# Patient Record
Sex: MALE | Race: WHITE | NOT HISPANIC OR LATINO | Employment: OTHER | URBAN - METROPOLITAN AREA
[De-identification: names, ages, dates, MRNs, and addresses within clinical notes are randomized per-mention and may not be internally consistent; named-entity substitution may affect disease eponyms.]

---

## 2020-07-16 ENCOUNTER — APPOINTMENT (OUTPATIENT)
Dept: RADIOLOGY | Facility: MEDICAL CENTER | Age: 65
End: 2020-07-16
Attending: HOSPITALIST

## 2020-07-16 ENCOUNTER — APPOINTMENT (OUTPATIENT)
Dept: RADIOLOGY | Facility: MEDICAL CENTER | Age: 65
End: 2020-07-16
Attending: EMERGENCY MEDICINE

## 2020-07-16 ENCOUNTER — OFFICE VISIT (OUTPATIENT)
Dept: URGENT CARE | Facility: PHYSICIAN GROUP | Age: 65
End: 2020-07-16

## 2020-07-16 ENCOUNTER — HOSPITAL ENCOUNTER (OUTPATIENT)
Facility: MEDICAL CENTER | Age: 65
End: 2020-07-17
Attending: EMERGENCY MEDICINE | Admitting: HOSPITALIST

## 2020-07-16 VITALS
RESPIRATION RATE: 20 BRPM | TEMPERATURE: 98.3 F | OXYGEN SATURATION: 97 % | WEIGHT: 192 LBS | SYSTOLIC BLOOD PRESSURE: 140 MMHG | DIASTOLIC BLOOD PRESSURE: 72 MMHG | BODY MASS INDEX: 27.49 KG/M2 | HEIGHT: 70 IN | HEART RATE: 72 BPM

## 2020-07-16 DIAGNOSIS — R07.89 OTHER CHEST PAIN: ICD-10-CM

## 2020-07-16 DIAGNOSIS — I10 ESSENTIAL HYPERTENSION: ICD-10-CM

## 2020-07-16 DIAGNOSIS — R94.31 ST ELEVATION: ICD-10-CM

## 2020-07-16 DIAGNOSIS — R07.1 CHEST PAIN ON BREATHING: ICD-10-CM

## 2020-07-16 DIAGNOSIS — Z98.890 STATUS POST LUMBAR SPINE SURGERY FOR DECOMPRESSION OF SPINAL CORD: ICD-10-CM

## 2020-07-16 DIAGNOSIS — Z87.891 SMOKING HISTORY: ICD-10-CM

## 2020-07-16 DIAGNOSIS — R06.02 SHORTNESS OF BREATH: ICD-10-CM

## 2020-07-16 PROBLEM — E87.6 HYPOKALEMIA: Status: ACTIVE | Noted: 2020-07-16

## 2020-07-16 PROBLEM — Z72.0 TOBACCO ABUSE: Status: ACTIVE | Noted: 2020-07-16

## 2020-07-16 LAB
ALBUMIN SERPL BCP-MCNC: 4.1 G/DL (ref 3.2–4.9)
ALBUMIN/GLOB SERPL: 1.5 G/DL
ALP SERPL-CCNC: 71 U/L (ref 30–99)
ALT SERPL-CCNC: 35 U/L (ref 2–50)
ANION GAP SERPL CALC-SCNC: 13 MMOL/L (ref 7–16)
APTT PPP: 32.3 SEC (ref 24.7–36)
AST SERPL-CCNC: 21 U/L (ref 12–45)
BASOPHILS # BLD AUTO: 0.4 % (ref 0–1.8)
BASOPHILS # BLD: 0.03 K/UL (ref 0–0.12)
BILIRUB SERPL-MCNC: 0.9 MG/DL (ref 0.1–1.5)
BUN SERPL-MCNC: 16 MG/DL (ref 8–22)
CALCIUM SERPL-MCNC: 9.2 MG/DL (ref 8.5–10.5)
CHLORIDE SERPL-SCNC: 102 MMOL/L (ref 96–112)
CO2 SERPL-SCNC: 25 MMOL/L (ref 20–33)
COVID ORDER STATUS COVID19: NORMAL
CREAT SERPL-MCNC: 0.69 MG/DL (ref 0.5–1.4)
D DIMER PPP IA.FEU-MCNC: 1.58 UG/ML (FEU) (ref 0–0.5)
EKG IMPRESSION: NORMAL
EKG IMPRESSION: NORMAL
EOSINOPHIL # BLD AUTO: 0.26 K/UL (ref 0–0.51)
EOSINOPHIL NFR BLD: 3.7 % (ref 0–6.9)
ERYTHROCYTE [DISTWIDTH] IN BLOOD BY AUTOMATED COUNT: 39.9 FL (ref 35.9–50)
GLOBULIN SER CALC-MCNC: 2.8 G/DL (ref 1.9–3.5)
GLUCOSE SERPL-MCNC: 108 MG/DL (ref 65–99)
HCT VFR BLD AUTO: 38.9 % (ref 42–52)
HGB BLD-MCNC: 14.1 G/DL (ref 14–18)
IMM GRANULOCYTES # BLD AUTO: 0.02 K/UL (ref 0–0.11)
IMM GRANULOCYTES NFR BLD AUTO: 0.3 % (ref 0–0.9)
INR PPP: 1.03 (ref 0.87–1.13)
LYMPHOCYTES # BLD AUTO: 1.24 K/UL (ref 1–4.8)
LYMPHOCYTES NFR BLD: 17.4 % (ref 22–41)
MCH RBC QN AUTO: 31.3 PG (ref 27–33)
MCHC RBC AUTO-ENTMCNC: 36.2 G/DL (ref 33.7–35.3)
MCV RBC AUTO: 86.4 FL (ref 81.4–97.8)
MONOCYTES # BLD AUTO: 0.77 K/UL (ref 0–0.85)
MONOCYTES NFR BLD AUTO: 10.8 % (ref 0–13.4)
NEUTROPHILS # BLD AUTO: 4.8 K/UL (ref 1.82–7.42)
NEUTROPHILS NFR BLD: 67.4 % (ref 44–72)
NRBC # BLD AUTO: 0 K/UL
NRBC BLD-RTO: 0 /100 WBC
PLATELET # BLD AUTO: 131 K/UL (ref 164–446)
PMV BLD AUTO: 11.3 FL (ref 9–12.9)
POTASSIUM SERPL-SCNC: 3.3 MMOL/L (ref 3.6–5.5)
PROT SERPL-MCNC: 6.9 G/DL (ref 6–8.2)
PROTHROMBIN TIME: 13.8 SEC (ref 12–14.6)
RBC # BLD AUTO: 4.5 M/UL (ref 4.7–6.1)
SODIUM SERPL-SCNC: 140 MMOL/L (ref 135–145)
TROPONIN T SERPL-MCNC: 12 NG/L (ref 6–19)
TROPONIN T SERPL-MCNC: 13 NG/L (ref 6–19)
TROPONIN T SERPL-MCNC: 9 NG/L (ref 6–19)
WBC # BLD AUTO: 7.1 K/UL (ref 4.8–10.8)

## 2020-07-16 PROCEDURE — 80053 COMPREHEN METABOLIC PANEL: CPT

## 2020-07-16 PROCEDURE — 99205 OFFICE O/P NEW HI 60 MIN: CPT | Performed by: NURSE PRACTITIONER

## 2020-07-16 PROCEDURE — 700111 HCHG RX REV CODE 636 W/ 250 OVERRIDE (IP): Performed by: EMERGENCY MEDICINE

## 2020-07-16 PROCEDURE — 84484 ASSAY OF TROPONIN QUANT: CPT

## 2020-07-16 PROCEDURE — C9803 HOPD COVID-19 SPEC COLLECT: HCPCS | Performed by: HOSPITALIST

## 2020-07-16 PROCEDURE — 99285 EMERGENCY DEPT VISIT HI MDM: CPT

## 2020-07-16 PROCEDURE — 71045 X-RAY EXAM CHEST 1 VIEW: CPT

## 2020-07-16 PROCEDURE — 99220 PR INITIAL OBSERVATION CARE,LEVL III: CPT | Performed by: HOSPITALIST

## 2020-07-16 PROCEDURE — 96374 THER/PROPH/DIAG INJ IV PUSH: CPT

## 2020-07-16 PROCEDURE — 85610 PROTHROMBIN TIME: CPT

## 2020-07-16 PROCEDURE — 85025 COMPLETE CBC W/AUTO DIFF WBC: CPT

## 2020-07-16 PROCEDURE — 85379 FIBRIN DEGRADATION QUANT: CPT

## 2020-07-16 PROCEDURE — A9270 NON-COVERED ITEM OR SERVICE: HCPCS | Performed by: HOSPITALIST

## 2020-07-16 PROCEDURE — 93005 ELECTROCARDIOGRAM TRACING: CPT | Performed by: EMERGENCY MEDICINE

## 2020-07-16 PROCEDURE — 99244 OFF/OP CNSLTJ NEW/EST MOD 40: CPT | Performed by: INTERNAL MEDICINE

## 2020-07-16 PROCEDURE — 93005 ELECTROCARDIOGRAM TRACING: CPT

## 2020-07-16 PROCEDURE — G0378 HOSPITAL OBSERVATION PER HR: HCPCS

## 2020-07-16 PROCEDURE — 96375 TX/PRO/DX INJ NEW DRUG ADDON: CPT

## 2020-07-16 PROCEDURE — 93000 ELECTROCARDIOGRAM COMPLETE: CPT | Performed by: NURSE PRACTITIONER

## 2020-07-16 PROCEDURE — U0003 INFECTIOUS AGENT DETECTION BY NUCLEIC ACID (DNA OR RNA); SEVERE ACUTE RESPIRATORY SYNDROME CORONAVIRUS 2 (SARS-COV-2) (CORONAVIRUS DISEASE [COVID-19]), AMPLIFIED PROBE TECHNIQUE, MAKING USE OF HIGH THROUGHPUT TECHNOLOGIES AS DESCRIBED BY CMS-2020-01-R: HCPCS

## 2020-07-16 PROCEDURE — 85730 THROMBOPLASTIN TIME PARTIAL: CPT

## 2020-07-16 PROCEDURE — 700102 HCHG RX REV CODE 250 W/ 637 OVERRIDE(OP): Performed by: HOSPITALIST

## 2020-07-16 RX ORDER — HYDROCODONE BITARTRATE AND ACETAMINOPHEN 10; 325 MG/1; MG/1
1 TABLET ORAL EVERY 6 HOURS PRN
COMMUNITY

## 2020-07-16 RX ORDER — NICOTINE 21 MG/24HR
21 PATCH, TRANSDERMAL 24 HOURS TRANSDERMAL
Status: DISCONTINUED | OUTPATIENT
Start: 2020-07-16 | End: 2020-07-17 | Stop reason: HOSPADM

## 2020-07-16 RX ORDER — POLYETHYLENE GLYCOL 3350 17 G/17G
1 POWDER, FOR SOLUTION ORAL
Status: DISCONTINUED | OUTPATIENT
Start: 2020-07-16 | End: 2020-07-17 | Stop reason: HOSPADM

## 2020-07-16 RX ORDER — AMOXICILLIN 250 MG
2 CAPSULE ORAL 2 TIMES DAILY
Status: DISCONTINUED | OUTPATIENT
Start: 2020-07-16 | End: 2020-07-17 | Stop reason: HOSPADM

## 2020-07-16 RX ORDER — LISINOPRIL 20 MG/1
20 TABLET ORAL DAILY
COMMUNITY

## 2020-07-16 RX ORDER — ACETAMINOPHEN 325 MG/1
650 TABLET ORAL EVERY 6 HOURS PRN
Status: DISCONTINUED | OUTPATIENT
Start: 2020-07-16 | End: 2020-07-17 | Stop reason: HOSPADM

## 2020-07-16 RX ORDER — DIAZEPAM 5 MG/1
5 TABLET ORAL
COMMUNITY

## 2020-07-16 RX ORDER — NIFEDIPINE 90 MG/1
90 TABLET, EXTENDED RELEASE ORAL DAILY
COMMUNITY

## 2020-07-16 RX ORDER — NIFEDIPINE 90 MG/1
90 TABLET, EXTENDED RELEASE ORAL DAILY
Status: DISCONTINUED | OUTPATIENT
Start: 2020-07-17 | End: 2020-07-17 | Stop reason: HOSPADM

## 2020-07-16 RX ORDER — PROMETHAZINE HYDROCHLORIDE 25 MG/1
12.5-25 TABLET ORAL EVERY 4 HOURS PRN
Status: DISCONTINUED | OUTPATIENT
Start: 2020-07-16 | End: 2020-07-17 | Stop reason: HOSPADM

## 2020-07-16 RX ORDER — BISACODYL 10 MG
10 SUPPOSITORY, RECTAL RECTAL
Status: DISCONTINUED | OUTPATIENT
Start: 2020-07-16 | End: 2020-07-17 | Stop reason: HOSPADM

## 2020-07-16 RX ORDER — ONDANSETRON 2 MG/ML
4 INJECTION INTRAMUSCULAR; INTRAVENOUS ONCE
Status: COMPLETED | OUTPATIENT
Start: 2020-07-16 | End: 2020-07-16

## 2020-07-16 RX ORDER — MORPHINE SULFATE 4 MG/ML
4 INJECTION, SOLUTION INTRAMUSCULAR; INTRAVENOUS ONCE
Status: COMPLETED | OUTPATIENT
Start: 2020-07-16 | End: 2020-07-16

## 2020-07-16 RX ORDER — NIFEDIPINE 20 MG/1
90 CAPSULE ORAL DAILY
COMMUNITY
End: 2020-07-16

## 2020-07-16 RX ORDER — ONDANSETRON 2 MG/ML
4 INJECTION INTRAMUSCULAR; INTRAVENOUS EVERY 4 HOURS PRN
Status: DISCONTINUED | OUTPATIENT
Start: 2020-07-16 | End: 2020-07-17 | Stop reason: HOSPADM

## 2020-07-16 RX ORDER — DIAZEPAM 5 MG/1
5 TABLET ORAL
Status: DISCONTINUED | OUTPATIENT
Start: 2020-07-16 | End: 2020-07-17 | Stop reason: HOSPADM

## 2020-07-16 RX ORDER — ONDANSETRON 4 MG/1
4 TABLET, ORALLY DISINTEGRATING ORAL EVERY 4 HOURS PRN
Status: DISCONTINUED | OUTPATIENT
Start: 2020-07-16 | End: 2020-07-17 | Stop reason: HOSPADM

## 2020-07-16 RX ORDER — NAPROXEN SODIUM 220 MG
880 TABLET ORAL 3 TIMES DAILY
COMMUNITY

## 2020-07-16 RX ORDER — LISINOPRIL 20 MG/1
20 TABLET ORAL DAILY
Status: DISCONTINUED | OUTPATIENT
Start: 2020-07-17 | End: 2020-07-17 | Stop reason: HOSPADM

## 2020-07-16 RX ORDER — PROCHLORPERAZINE EDISYLATE 5 MG/ML
5-10 INJECTION INTRAMUSCULAR; INTRAVENOUS EVERY 4 HOURS PRN
Status: DISCONTINUED | OUTPATIENT
Start: 2020-07-16 | End: 2020-07-17 | Stop reason: HOSPADM

## 2020-07-16 RX ORDER — PROMETHAZINE HYDROCHLORIDE 12.5 MG/1
12.5-25 SUPPOSITORY RECTAL EVERY 4 HOURS PRN
Status: DISCONTINUED | OUTPATIENT
Start: 2020-07-16 | End: 2020-07-17 | Stop reason: HOSPADM

## 2020-07-16 RX ORDER — NICOTINE 21 MG/24HR
21 PATCH, TRANSDERMAL 24 HOURS TRANSDERMAL
Status: DISCONTINUED | OUTPATIENT
Start: 2020-07-17 | End: 2020-07-16

## 2020-07-16 RX ADMIN — MORPHINE SULFATE 4 MG: 4 INJECTION INTRAVENOUS at 15:10

## 2020-07-16 RX ADMIN — DIAZEPAM 5 MG: 5 TABLET ORAL at 20:02

## 2020-07-16 RX ADMIN — ONDANSETRON 4 MG: 2 INJECTION INTRAMUSCULAR; INTRAVENOUS at 15:10

## 2020-07-16 ASSESSMENT — LIFESTYLE VARIABLES
ON A TYPICAL DAY WHEN YOU DRINK ALCOHOL HOW MANY DRINKS DO YOU HAVE: 0
EVER FELT BAD OR GUILTY ABOUT YOUR DRINKING: NO
ALCOHOL_USE: NO
HAVE PEOPLE ANNOYED YOU BY CRITICIZING YOUR DRINKING: NO
AVERAGE NUMBER OF DAYS PER WEEK YOU HAVE A DRINK CONTAINING ALCOHOL: 0
CONSUMPTION TOTAL: NEGATIVE
TOTAL SCORE: 0
EVER HAD A DRINK FIRST THING IN THE MORNING TO STEADY YOUR NERVES TO GET RID OF A HANGOVER: NO
HAVE YOU EVER FELT YOU SHOULD CUT DOWN ON YOUR DRINKING: NO
EVER_SMOKED: YES
TOTAL SCORE: 0
HOW MANY TIMES IN THE PAST YEAR HAVE YOU HAD 5 OR MORE DRINKS IN A DAY: 0
TOTAL SCORE: 0

## 2020-07-16 ASSESSMENT — ENCOUNTER SYMPTOMS
SYNCOPE: 1
COUGH: 0
SHORTNESS OF BREATH: 1
HEMOPTYSIS: 0
CHILLS: 0
BACK PAIN: 1
FEVER: 0
SHORTNESS OF BREATH: 1
EXERTIONAL CHEST PRESSURE: 1
NAUSEA: 1
LOWER EXTREMITY EDEMA: 0
DIAPHORESIS: 1
IRREGULAR HEARTBEAT: 0
LEG PAIN: 0
ABDOMINAL PAIN: 0
CLAUDICATION: 0
COUGH: 0
PALPITATIONS: 0
WEAKNESS: 1
DIZZINESS: 1
ORTHOPNEA: 1
NEAR-SYNCOPE: 1

## 2020-07-16 ASSESSMENT — PATIENT HEALTH QUESTIONNAIRE - PHQ9
SUM OF ALL RESPONSES TO PHQ9 QUESTIONS 1 AND 2: 0
1. LITTLE INTEREST OR PLEASURE IN DOING THINGS: NOT AT ALL
2. FEELING DOWN, DEPRESSED, IRRITABLE, OR HOPELESS: NOT AT ALL

## 2020-07-16 ASSESSMENT — FIBROSIS 4 INDEX: FIB4 SCORE: 1.71

## 2020-07-16 NOTE — ED TRIAGE NOTES
"Chief Complaint   Patient presents with   • Chest Pain     Started today at 0730. Used 2 epi pens yesterday after being stung by a bee.     /79   Pulse 74   Temp 36.8 °C (98.3 °F) (Tympanic)   Resp 18   Ht 1.778 m (5' 10\")   Wt 87.1 kg (192 lb)   SpO2 98%   BMI 27.55 kg/m²     PT BIB EMS.    PT was stung by a bee yesterday and took 2 epi pens. Pt reports chest pain started today at 0730.     Pt received 325 mg asa, 2 nasal nitro sprays pta.   "

## 2020-07-16 NOTE — CONSULTS
Cardiology Consultation Note      Date of service: 7/16/2020      Requesting Physician: Dr. Quang Sifuentes      Reason for consultation: Chest pain with abnormal EKG      History of present illness    Patient is 65 years old male with hypertension presented with lower chest pain since 7:00 this morning.    He normally lives in [place] and was on his way to [place].  He stated that he got stung by a bee and gave himself an epinephrine shot yesterday.  This morning he felt lousy when he woke up.    Since around 7:00 AM, he has been having constant chest discomfort which he described as tightness across lower chest associated with some shortness of breath.  He denies nausea vomiting or diaphoresis.    Denies recent flulike illness or chest trauma.  No prior cardiac history or DM.    Denies noncompliant with his medication.    Stated that his blood pressure has been in normal range.    He also stated that his recent lipid test was in excellent range.        Allergies   Allergen Reactions   • Bee Anaphylaxis       @HOMEM[facility]S    No current facility-administered medications for this encounter.     Current Outpatient Medications:   •  lisinopril (PRINIVIL) 20 MG Tab, Take 20 mg by mouth every day., Disp: , Rfl:   •  diazePAM (VALIUM) 5 MG Tab, Take 5 mg by mouth every evening as needed (back pain). Indications: Muscle Spasm, Disp: , Rfl:   •  sildenafil 5 mg/2 mL in Ora-Sweet-Ora-Plus liquid oral suspension, Take 0.25 mg/kg by mouth every 8 hours. Shake well  Last dose 3 weeks ago per pt, Disp: , Rfl:   •  HYDROcodone/acetaminophen (NORCO)  MG Tab, Take 1 Tab by mouth every 6 hours as needed., Disp: , Rfl:   •  NIFEdipine SR (PROCARDIA XL) 90 MG CR tablet, Take 90 mg by mouth every day., Disp: , Rfl:   •  naproxen (ALEVE) 220 MG tablet, Take 880 mg by mouth 3 times a day. 4 tablets = 880 mg, Disp: , Rfl:     History reviewed. No pertinent past medical history.    + Back surgery 3-4 weeks ago in [place] for L1  compression fracture    Family history; mother  from myocardial infarction at age 71    Social History     Socioeconomic History   • Marital status:      Spouse name: Not on file   • Number of children: Not on file   • Years of education: Not on file   • Highest education level: Not on file   Occupational History   • Not on file   Social Needs   • Financial resource strain: Not on file   • Food insecurity     Worry: Not on file     Inability: Not on file   • Transportation needs     Medical: Not on file     Non-medical: Not on file   Tobacco Use   • Smoking status: Current Every Day Smoker   • Smokeless tobacco: Current User     Types: Chew   Substance and Sexual Activity   • Alcohol use: Not Currently   • Drug use: Yes     Types: Marijuana   • Sexual activity: Not Currently   Lifestyle   • Physical activity     Days per week: Not on file     Minutes per session: Not on file   • Stress: Not on file   Relationships   • Social connections     Talks on phone: Not on file     Gets together: Not on file     Attends [employer] service: Not on file     Active member of club or organization: Not on file     Attends meetings of clubs or organizations: Not on file     Relationship status: Not on file   • Intimate partner violence     Fear of current or ex partner: Not on file     Emotionally abused: Not on file     Physically abused: Not on file     Forced sexual activity: Not on file   Other Topics Concern   • Not on file   Social History Narrative   • Not on file       Review of systems;    General: No fever, chills, no recent weight change,+ fatigue  HENT: No discharge, no ringing in the ears, no toothache or sore throat, no neck pain  Eyes: No redness, no blurred vision or double vision  Heart: No palpitation, no PND or orthopnea, no claudication, no leg swelling  Lung: No productive cough, no hemoptysis  Abdomen: No abdominal pain, no nausea vomiting or diarrhea, no blood in stool  : No dysuria, no frequency  or hematuria  Musculoskeletal: No myalgia, + back pain, some joint pain  Hematology: No easy bruising  Skin: No rash or itching  Neurological: No headache, no new focal weakness or numbness  Psychological: Denies depression, anxiety or insomnia  All other review of systems are negative    Vitals:    07/16/20 1455 07/16/20 1551 07/16/20 1611 07/16/20 1631   BP: 131/79 133/78 117/57 135/78   Pulse: 74 71 74 76   Resp: 18 (!) 23 (!) 21 (!) 28   Temp: 36.8 °C (98.3 °F)      TempSrc: Tympanic      SpO2: 98% 100% 93% 93%   Weight:       Height:         GENERAL not in acute distress, not dyspnic at rest  Head atraumatic, normocephalic  Eyes EOMI  ENT neck supple, no JVD, no carotid bruits or thyromegaly  Lung good expansion, distant sound, no rales or wheezing  Prominent left lower anterior rib/chest wall ? Old rib Fx  Heart RRR, normal rate, no murmur, gallop or rub  Abd soft, no tenderness, mass or bruits  Ext no edema  Skin no ecchymosis or petechiae  Musculoskeletal no deformity  Neuro grossly intact  Psych normal mood, normal affect    EKG by my review shows sinus rhythm with slight ST elevation in lead aVF but no definite ST elevation in other leads    High sensitivity troponin T 13    Assessment and plans    1.  Chest pain  EKG is not diagnostic.  Troponin is negative after persistent chest pain of more than 8 hours.  He appears comfortable.  I think the likelihood of acute coronary event is relatively low.  Does have couple risk factor.  Agree with observation overnight with serial troponin.   If troponin continues to be negative would proceed with stress test in the morning.    2.  Hypertension, benign  BP appears to be in the normal range.  We will continue home med    Will follow the patient along with you.  Thank you consultation.    Please note that this dictation was created using voice recognition software. I have worked with consultants from the vendor as well as technical experts from [employer] to  optimize the interface. I have made every reasonable attempt to correct obvious errors, but I expect that there are errors of grammar and possibly content I did not discover before finalizing the note

## 2020-07-16 NOTE — ED NOTES
Med rec complete per interview with patient at bedside.  Allergies reviewed.  No oral ABX taken in past 14 days.

## 2020-07-16 NOTE — ED PROVIDER NOTES
ED Provider Note    CHIEF COMPLAINT  Chief Complaint   Patient presents with   • Chest Pain     Started today at 0730. Used 2 epi pens yesterday after being stung by a bee.       HPI  Xavier Gordon is a 65 y.o. male who presents to the emergency department for evaluation of chest pain.  The patient has anterior chest pain that started today around 730.  It is a painful sensation upon of his chest.  It does not radiate.  With associated shortness of breath no nausea vomiting or diaphoresis.  Pain is been constant and dull and achy in nature.  No tearing pain.  No history of PE or DVT.  No travel or immobilization.  No fever cough or sputum production.  The patient does have a history of high blood pressure.  Is not had a cardiac evaluation.  He did use an EpiPen yesterday x2 doses for anaphylactic-like symptoms after a bee sting.    Patient was seen in the urgent care today and had an abnormal EKG with some concerns of inferior ST segment elevation.  He was given aspirin and nitroglycerin his pain improved to about a 5 out of 10 and persisted at level.  Denies any other acute concerns or complaints.    REVIEW OF SYSTEMS  See HPI for further details. All other systems are negative.    PAST MEDICAL HISTORY  History reviewed. No pertinent past medical history.  hypertension    FAMILY HISTORY  History reviewed. No pertinent family history.    SOCIAL HISTORY  Social History     Socioeconomic History   • Marital status: Not on file     Spouse name: Not on file   • Number of children: Not on file   • Years of education: Not on file   • Highest education level: Not on file   Occupational History   • Not on file   Social Needs   • Financial resource strain: Not on file   • Food insecurity     Worry: Not on file     Inability: Not on file   • Transportation needs     Medical: Not on file     Non-medical: Not on file   Tobacco Use   • Smoking status: Current Every Day Smoker   • Smokeless tobacco: Current User     Types: Chew  "  Substance and Sexual Activity   • Alcohol use: Not Currently   • Drug use: Yes     Types: Marijuana   • Sexual activity: Not Currently   Lifestyle   • Physical activity     Days per week: Not on file     Minutes per session: Not on file   • Stress: Not on file   Relationships   • Social connections     Talks on phone: Not on file     Gets together: Not on file     Attends Druze service: Not on file     Active member of club or organization: Not on file     Attends meetings of clubs or organizations: Not on file     Relationship status: Not on file   • Intimate partner violence     Fear of current or ex partner: Not on file     Emotionally abused: Not on file     Physically abused: Not on file     Forced sexual activity: Not on file   Other Topics Concern   • Not on file   Social History Narrative   • Not on file       SURGICAL HISTORY  History reviewed. No pertinent surgical history.    CURRENT MEDICATIONS  Home Medications    **Home medications have not yet been reviewed for this encounter**         ALLERGIES  Allergies   Allergen Reactions   • Bee Anaphylaxis       PHYSICAL EXAM  VITAL SIGNS: /79   Pulse 74   Temp 36.8 °C (98.3 °F) (Tympanic)   Resp 18   Ht 1.778 m (5' 10\")   Wt 87.1 kg (192 lb)   SpO2 98%   BMI 27.55 kg/m²    Constitutional: Well developed, Well nourished, No acute distress, Non-toxic appearance.   HENT: Normocephalic, Atraumatic, Bilateral external ears normal, Oropharynx moist, No oral exudates, Nose normal.   Eyes: PERRL, EOMI, Conjunctiva normal, No discharge.   Neck: Normal range of motion, No tenderness, Supple, No stridor.     Cardiovascular: Normal heart rate, Normal rhythm, No murmurs, No rubs, No gallops.   Thorax & Lungs: Normal breath sounds, No respiratory distress,   Abdomen: Bowel sounds normal, Soft, No tenderness,   Skin: Warm, Dry, No erythema, No rash.   Back: No tenderness, No CVA tenderness.   Musculoskeletal: Good range of motion in all major joints. No " tenderness to palpation or major deformities noted.  Pulses all 4 extremities.  Neurologic: Alert,  No focal deficits noted.   Psychiatric: Affect normal      Results for orders placed or performed during the hospital encounter of 07/16/20   CBC WITH DIFFERENTIAL   Result Value Ref Range    WBC 7.1 4.8 - 10.8 K/uL    RBC 4.50 (L) 4.70 - 6.10 M/uL    Hemoglobin 14.1 14.0 - 18.0 g/dL    Hematocrit 38.9 (L) 42.0 - 52.0 %    MCV 86.4 81.4 - 97.8 fL    MCH 31.3 27.0 - 33.0 pg    MCHC 36.2 (H) 33.7 - 35.3 g/dL    RDW 39.9 35.9 - 50.0 fL    Platelet Count 131 (L) 164 - 446 K/uL    MPV 11.3 9.0 - 12.9 fL    Neutrophils-Polys 67.40 44.00 - 72.00 %    Lymphocytes 17.40 (L) 22.00 - 41.00 %    Monocytes 10.80 0.00 - 13.40 %    Eosinophils 3.70 0.00 - 6.90 %    Basophils 0.40 0.00 - 1.80 %    Immature Granulocytes 0.30 0.00 - 0.90 %    Nucleated RBC 0.00 /100 WBC    Neutrophils (Absolute) 4.80 1.82 - 7.42 K/uL    Lymphs (Absolute) 1.24 1.00 - 4.80 K/uL    Monos (Absolute) 0.77 0.00 - 0.85 K/uL    Eos (Absolute) 0.26 0.00 - 0.51 K/uL    Baso (Absolute) 0.03 0.00 - 0.12 K/uL    Immature Granulocytes (abs) 0.02 0.00 - 0.11 K/uL    NRBC (Absolute) 0.00 K/uL   COMP METABOLIC PANEL   Result Value Ref Range    Sodium 140 135 - 145 mmol/L    Potassium 3.3 (L) 3.6 - 5.5 mmol/L    Chloride 102 96 - 112 mmol/L    Co2 25 20 - 33 mmol/L    Anion Gap 13.0 7.0 - 16.0    Glucose 108 (H) 65 - 99 mg/dL    Bun 16 8 - 22 mg/dL    Creatinine 0.69 0.50 - 1.40 mg/dL    Calcium 9.2 8.5 - 10.5 mg/dL    AST(SGOT) 21 12 - 45 U/L    ALT(SGPT) 35 2 - 50 U/L    Alkaline Phosphatase 71 30 - 99 U/L    Total Bilirubin 0.9 0.1 - 1.5 mg/dL    Albumin 4.1 3.2 - 4.9 g/dL    Total Protein 6.9 6.0 - 8.2 g/dL    Globulin 2.8 1.9 - 3.5 g/dL    A-G Ratio 1.5 g/dL   TROPONIN   Result Value Ref Range    Troponin T 13 6 - 19 ng/L   APTT   Result Value Ref Range    APTT 32.3 24.7 - 36.0 sec   PROTHROMBIN TIME (INR)   Result Value Ref Range    PT 13.8 12.0 - 14.6 sec    INR  1.03 0.87 - 1.13   ESTIMATED GFR   Result Value Ref Range    GFR If African American >60 >60 mL/min/1.73 m 2    GFR If Non African American >60 >60 mL/min/1.73 m 2   Routine (COVID/SARS COV-2 In-House PCR up to 24 hours)    Specimen: Nasopharyngeal; Respirate   Result Value Ref Range    COVID Order Status Received    SARS-CoV-2, PCR (In-House)   Result Value Ref Range    SARS-CoV-2 Source NP Swab    D-DIMER   Result Value Ref Range    D-Dimer Screen 1.58 (H) 0.00 - 0.50 ug/mL (FEU)   TROPONIN   Result Value Ref Range    Troponin T 12 6 - 19 ng/L   EKG (NOW)   Result Value Ref Range    Report       [facility]    Test Date:  2020  Pt Name:    REGINO ROWLEY                Department: ER  MRN:        [id number]                      Room:       [facility]  Gender:     Male                         Technician: [id number]  :        1955                   Requested By:ER TRIAGE PROTOCOL  Order #:    [id number]                    Reading MD: TIFFANI RODRIGUEZ. [facility]    Measurements  Intervals                                Axis  Rate:       74                           P:          51  TX:         176                          QRS:        20  QRSD:       102                          T:          60  QT:         392  QTc:        435    Interpretive Statements  SINUS RHYTHM  BORDERLINE ST ELEVATION, INFERIOR LEADS  No previous ECG available for comparison  Electronically Signed On 2020 15:10:16 PDT by TIFFANI RODRIGUEZ. AMD     EKG   Result Value Ref Range    Report       [facility]    Test Date:  2020  Pt Name:    REGINO ROWLEY                Department: ER  MRN:        [id number]                      Room:       GR 37  Gender:     Male                         Technician: [id number]  :        1955                   Requested By:TIFFANI RODRIGUEZ  Order #:    [id number]                    Reading MD:    Measurements  Intervals                                 Axis  Rate:       78                           P:          62  AK:         184                          QRS:        28  QRSD:       100                          T:          68  QT:         392  QTc:        447    Interpretive Statements  SINUS RHYTHM  BORDERLINE ST ELEVATION, INFERIOR LEADS  Compared to ECG 07/16/2020 14:52:22  No significant changes          DX-CHEST-PORTABLE (1 VIEW)   Final Result      Mild cardiac silhouette enlargement without consolidation detected      NM-CARDIAC STRESS TEST    (Results Pending)   CT-CTA CHEST PULMONARY ARTERY W/ RECONS    (Results Pending)       COURSE & MEDICAL DECISION MAKING  Pertinent Labs & Imaging studies reviewed. (See chart for details)    The patient was seen and examined by me.  He has ongoing chest pain and EKG concerning for ACS.    I considered a broad differential diagnosis for chest pain including not limited to ACS, pneumonia, pneumothorax PE, and dissection.    Clinical history is not suggestive of a dissection or PE.  Chest x-ray is ordered.  Because of the abnormal EKG the urgent care page the cardiologist.  He is placed on a cardiac monitor and given oxygen and some IV morphine and Zofran.    The patient given morphine and Zofran his pain is improved but not quite resolved.  Is placed on oxygen.  Because of the abnormal EKG at the urgent care I spoke with Dr Moreno on call for cardiology.  He felt the EKG was nonspecific and wanted to wait for the troponins.  He will see the patient in consultation.    A second EKG was done the patient was here in the ER this did not show any significant change.  The patient will be hospitalized for continued work-up and treatment of his chest pain.  Spoke with the hospitalist and care is transferred at that time.      FINAL IMPRESSION  1. Other chest pain         2.   3.         Electronically signed by: Quang Sifuentes M.D., 7/16/2020 3:07 PM

## 2020-07-16 NOTE — PROGRESS NOTES
Subjective:   Xavier Gordon is a 65 y.o. male who presents for Chest Pain (Pt states he is experiencing new onset chest pain starting at approx. 0700, Pt states he has FHx of cardiac issues, Pt describes a numbing/tingling sensation in LT arm. )       Chest Pain    This is a new problem. The current episode started today. The onset quality is sudden. The problem occurs constantly. The problem has been rapidly worsening. The pain is present in the substernal region. The pain is severe. The quality of the pain is described as burning, crushing, heavy, numbness, pressure and sharp. The pain radiates to the left arm. Associated symptoms include back pain (r/t recent spine surgery), diaphoresis, dizziness, exertional chest pressure, malaise/fatigue, nausea, near-syncope, orthopnea, shortness of breath, syncope and weakness. Pertinent negatives include no abdominal pain, claudication, cough, hemoptysis, irregular heartbeat, leg pain, lower extremity edema or palpitations. The pain is aggravated by breathing and movement. He has tried nothing for the symptoms. Risk factors include male gender and smoking/tobacco exposure (50+ pack year history).   His past medical history is significant for recent injury (s/p L spine surgery 4 weeks ago).   His family medical history is significant for CAD, heart disease, hyperlipidemia and hypertension.     Pt presents for evaluation of a new problem, reports being stung by a bee yesterday at which he has anaphylactic reaction to.  Was given 2 doses of epinephrine, and was lightheaded, dizzy, at that time.  This morning he woke up with chest pain rated 8-10 out of 10 that is substernal that radiates to the left arm.  Per his coworker, he has been and out of consciousness being alert and oriented x2-3.  Patient reports difficulty with breathing, unable to take a deep breath, feeling weak and tired.  Patient has not taken anything to relieve his pain, he did take his blood pressure medicine  this morning as directed.  Of note, he does have difficulty laying down, however he attributes that to a recent lumbar spine surgery that he had in [place].    Review of Systems   Constitutional: Positive for diaphoresis and malaise/fatigue.   Respiratory: Positive for shortness of breath. Negative for cough and hemoptysis.    Cardiovascular: Positive for chest pain, orthopnea, syncope and near-syncope. Negative for palpitations and claudication.   Gastrointestinal: Positive for nausea. Negative for abdominal pain.   Musculoskeletal: Positive for back pain (r/t recent spine surgery).   Neurological: Positive for dizziness and weakness.       MEDS: No current outpatient medications on file.  ALLERGIES: Allergies not on file    Patient's PMH, SocHx, SurgHx, FamHx, Drug allergies and medications were reviewed.     Objective:   There were no vitals taken for this visit.    Physical Exam  Vitals signs and nursing note reviewed.   Constitutional:       General: He is awake. He is in acute distress.      Appearance: Normal appearance. He is well-developed and normal weight. He is ill-appearing and diaphoretic.   HENT:      Head: Normocephalic and atraumatic.      Right Ear: External ear normal.      Left Ear: External ear normal.      Nose: Nose normal.      Mouth/Throat:      Mouth: Mucous membranes are moist.      Pharynx: Oropharynx is clear.   Eyes:      Extraocular Movements: Extraocular movements intact.      Conjunctiva/sclera: Conjunctivae normal.   Neck:      Musculoskeletal: Full passive range of motion without pain, normal range of motion and neck supple.      Thyroid: No thyromegaly.      Trachea: Trachea normal.   Cardiovascular:      Rate and Rhythm: Normal rate and regular rhythm.      Pulses: Normal pulses.      Heart sounds: Normal heart sounds, S1 normal and S2 normal. No murmur. No friction rub. No gallop.    Pulmonary:      Effort: Tachypnea and respiratory distress present.      Breath sounds: Normal  breath sounds and air entry. No decreased breath sounds, wheezing, rhonchi or rales.   Chest:      Chest wall: No tenderness.   Abdominal:      General: Bowel sounds are normal.      Palpations: Abdomen is soft.   Musculoskeletal: Normal range of motion.      Right lower leg: No edema.      Left lower leg: No edema.   Lymphadenopathy:      Cervical: No cervical adenopathy.   Skin:     General: Skin is warm.      Capillary Refill: Capillary refill takes less than 2 seconds.   Neurological:      General: No focal deficit present.      Gait: Gait is intact.      Comments: Appears sleepy, in and out of spontaneous eye opening   Psychiatric:         Attention and Perception: Attention and perception normal.         Mood and Affect: Mood is anxious.         Speech: Speech normal.         Behavior: Behavior normal. Behavior is cooperative.         Thought Content: Thought content normal.         Judgment: Judgment normal.            Assessment/Plan:   1. Chest pain on breathing  - EKG - Clinic Performed    2. ST elevation    3. Shortness of breath  - EKG - Clinic Performed    4. Status post lumbar spine surgery for decompression of spinal cord    5. Essential hypertension    6. Smoking history    Patient was brought in emergently from parking lot the wheelchair.  He appeared to be in acute distress, EKG was obtained and reviewed by me, he was given 325 mg of aspirin as well as put on 2 L of oxygen.  Due to concern for ACS, EMS was called to be transported emergently.  Patient history was obtained the best admitted the ability due to time for a quick transfer via EMS.  All questions were answered patient agrees with plan of care.

## 2020-07-17 ENCOUNTER — APPOINTMENT (OUTPATIENT)
Dept: CARDIOLOGY | Facility: MEDICAL CENTER | Age: 65
End: 2020-07-17
Attending: HOSPITALIST

## 2020-07-17 ENCOUNTER — APPOINTMENT (OUTPATIENT)
Dept: RADIOLOGY | Facility: MEDICAL CENTER | Age: 65
End: 2020-07-17
Attending: HOSPITALIST

## 2020-07-17 VITALS
RESPIRATION RATE: 18 BRPM | HEIGHT: 70 IN | OXYGEN SATURATION: 97 % | WEIGHT: 194.22 LBS | BODY MASS INDEX: 27.81 KG/M2 | TEMPERATURE: 97.6 F | SYSTOLIC BLOOD PRESSURE: 135 MMHG | DIASTOLIC BLOOD PRESSURE: 80 MMHG | HEART RATE: 81 BPM

## 2020-07-17 LAB
CHOLEST SERPL-MCNC: 87 MG/DL (ref 100–199)
EKG IMPRESSION: NORMAL
HDLC SERPL-MCNC: 30 MG/DL
LDLC SERPL CALC-MCNC: 43 MG/DL
SARS-COV-2 RNA RESP QL NAA+PROBE: NOTDETECTED
SPECIMEN SOURCE: NORMAL
TRIGL SERPL-MCNC: 69 MG/DL (ref 0–149)

## 2020-07-17 PROCEDURE — G0378 HOSPITAL OBSERVATION PER HR: HCPCS

## 2020-07-17 PROCEDURE — 93005 ELECTROCARDIOGRAM TRACING: CPT | Performed by: HOSPITALIST

## 2020-07-17 PROCEDURE — A9502 TC99M TETROFOSMIN: HCPCS

## 2020-07-17 PROCEDURE — 96372 THER/PROPH/DIAG INJ SC/IM: CPT

## 2020-07-17 PROCEDURE — 700111 HCHG RX REV CODE 636 W/ 250 OVERRIDE (IP)

## 2020-07-17 PROCEDURE — A9270 NON-COVERED ITEM OR SERVICE: HCPCS | Performed by: HOSPITALIST

## 2020-07-17 PROCEDURE — 700117 HCHG RX CONTRAST REV CODE 255: Performed by: HOSPITALIST

## 2020-07-17 PROCEDURE — 700102 HCHG RX REV CODE 250 W/ 637 OVERRIDE(OP): Performed by: HOSPITALIST

## 2020-07-17 PROCEDURE — 96375 TX/PRO/DX INJ NEW DRUG ADDON: CPT

## 2020-07-17 PROCEDURE — 71275 CT ANGIOGRAPHY CHEST: CPT

## 2020-07-17 PROCEDURE — 93010 ELECTROCARDIOGRAM REPORT: CPT | Performed by: INTERNAL MEDICINE

## 2020-07-17 PROCEDURE — 700111 HCHG RX REV CODE 636 W/ 250 OVERRIDE (IP): Performed by: HOSPITALIST

## 2020-07-17 PROCEDURE — 80061 LIPID PANEL: CPT

## 2020-07-17 PROCEDURE — 99214 OFFICE O/P EST MOD 30 MIN: CPT | Performed by: INTERNAL MEDICINE

## 2020-07-17 RX ORDER — REGADENOSON 0.08 MG/ML
INJECTION, SOLUTION INTRAVENOUS
Status: COMPLETED
Start: 2020-07-17 | End: 2020-07-17

## 2020-07-17 RX ORDER — REGADENOSON 0.08 MG/ML
0.4 INJECTION, SOLUTION INTRAVENOUS ONCE
Status: COMPLETED | OUTPATIENT
Start: 2020-07-17 | End: 2020-07-17

## 2020-07-17 RX ORDER — LORAZEPAM 2 MG/ML
1 INJECTION INTRAMUSCULAR ONCE
Status: COMPLETED | OUTPATIENT
Start: 2020-07-17 | End: 2020-07-17

## 2020-07-17 RX ORDER — LORAZEPAM 2 MG/ML
INJECTION INTRAMUSCULAR
Status: COMPLETED
Start: 2020-07-17 | End: 2020-07-17

## 2020-07-17 RX ADMIN — ASPIRIN 81 MG: 81 TABLET, COATED ORAL at 05:40

## 2020-07-17 RX ADMIN — ENOXAPARIN SODIUM 40 MG: 100 INJECTION SUBCUTANEOUS at 05:41

## 2020-07-17 RX ADMIN — LORAZEPAM 1 MG: 2 INJECTION INTRAMUSCULAR at 08:11

## 2020-07-17 RX ADMIN — NICOTINE TRANSDERMAL SYSTEM 21 MG: 21 PATCH, EXTENDED RELEASE TRANSDERMAL at 05:41

## 2020-07-17 RX ADMIN — REGADENOSON 0.4 MG: 0.08 INJECTION, SOLUTION INTRAVENOUS at 13:53

## 2020-07-17 RX ADMIN — NIFEDIPINE 90 MG: 90 TABLET, FILM COATED, EXTENDED RELEASE ORAL at 05:40

## 2020-07-17 RX ADMIN — LISINOPRIL 20 MG: 20 TABLET ORAL at 05:40

## 2020-07-17 RX ADMIN — IOHEXOL 81 ML: 350 INJECTION, SOLUTION INTRAVENOUS at 09:02

## 2020-07-17 RX ADMIN — LORAZEPAM 1 MG: 2 INJECTION INTRAMUSCULAR; INTRAVENOUS at 08:11

## 2020-07-17 ASSESSMENT — ENCOUNTER SYMPTOMS
CHOKING: 0
STRIDOR: 0
SHORTNESS OF BREATH: 0
COUGH: 0
APNEA: 0
CHEST TIGHTNESS: 0

## 2020-07-17 NOTE — PROGRESS NOTES
Cardiology Follow Up Progress Note    Date of Service  7/17/2020    Attending Physician  Ernesto Frederick, *    Chief Complaint     Chest discomfort  HPI  Xavier Gordon is a 65 y.o. male admitted 7/16/2020 with chest pain.      Past medical history significant for tobacco abuse, hypertension, no prior cardiac history.    Interim Events    Drowsy this morning  Received Ativan prior to CTA chest  No overnight cardiac events  Denies angina    Review of Systems  Review of Systems   Respiratory: Negative for apnea, cough, choking, chest tightness, shortness of breath and stridor.    Cardiovascular: Negative for chest pain and leg swelling.       Vital signs in last 24 hours  Temp:  [36.7 °C (98 °F)-37.4 °C (99.3 °F)] 36.7 °C (98.1 °F)  Pulse:  [71-90] 84  Resp:  [15-28] 18  BP: (117-152)/(57-87) 128/75  SpO2:  [92 %-100 %] 96 %    Physical Exam  Physical Exam    Lab Review  Lab Results   Component Value Date/Time    WBC 7.1 07/16/2020 03:11 PM    RBC 4.50 (L) 07/16/2020 03:11 PM    HEMOGLOBIN 14.1 07/16/2020 03:11 PM    HEMATOCRIT 38.9 (L) 07/16/2020 03:11 PM    MCV 86.4 07/16/2020 03:11 PM    MCH 31.3 07/16/2020 03:11 PM    MCHC 36.2 (H) 07/16/2020 03:11 PM    MPV 11.3 07/16/2020 03:11 PM      Lab Results   Component Value Date/Time    SODIUM 140 07/16/2020 03:11 PM    POTASSIUM 3.3 (L) 07/16/2020 03:11 PM    CHLORIDE 102 07/16/2020 03:11 PM    CO2 25 07/16/2020 03:11 PM    GLUCOSE 108 (H) 07/16/2020 03:11 PM    BUN 16 07/16/2020 03:11 PM    CREATININE 0.69 07/16/2020 03:11 PM      Lab Results   Component Value Date/Time    ASTSGOT 21 07/16/2020 03:11 PM    ALTSGPT 35 07/16/2020 03:11 PM     Lab Results   Component Value Date/Time    CHOLSTRLTOT 87 (L) 07/17/2020 02:50 AM    LDL 43 07/17/2020 02:50 AM    HDL 30 (A) 07/17/2020 02:50 AM    TRIGLYCERIDE 69 07/17/2020 02:50 AM    TROPONINT 9 07/16/2020 10:11 PM       No results for input(s): NTPROBNP in the last 72 hours.    Cardiac Imaging and Procedures  Review  EKG: Mild ST elevation in aVF    Echocardiogram:      Cardiac Catheterization: Not applicable    Imaging  Chest X-Ray:  Mild cardiac silhouette enlargement without consolidation detected     Stress Test:        CTA chest pulmonary arteries 7/17/2020  -No pulmonary embolus  -Mild paraseptal emphysema  -Enlarged, nodular liver, suggestive of cirrhosis, associated splenomegaly    Assessment/Plan    Chest pain with abnormal EKG  -Serial troponins  -Troponins negative x3  -Echocardiogram pending  -MPI pending  -Denies chest pain this morning      Hypertension  -Well-controlled  -Patient is on lisinopril 20 mg , Procardia 90 mg at home    Hypokalemia (3.3) on admit  -Repleted    Tobacco abuse  -Nicotine patch    Follow-up on TTE and MPI        Please contact me with any questions.    MICHAEL Spencer.   Cardiologist, [facility]  [phone]

## 2020-07-17 NOTE — PROGRESS NOTES
"Patient unable to complete CT due to curvature of CT bed causing extreme pain. Patient asked if pre-medication would help, patient is unclear \"if it will even help.\" Walt SERVIN updated on patient's inability to complete CT, no new orders.  "

## 2020-07-17 NOTE — PROGRESS NOTES
Received call from monitor room that pt appears to have ST elevation in 3 leads, MD updated and order placed for stat EKG

## 2020-07-17 NOTE — CARE PLAN
Problem: Communication  Goal: The ability to communicate needs accurately and effectively will improve  Outcome: PROGRESSING AS EXPECTED     Problem: Safety  Goal: Will remain free from injury  Outcome: PROGRESSING AS EXPECTED  Goal: Will remain free from falls  Outcome: PROGRESSING AS EXPECTED     Problem: Respiratory:  Goal: Respiratory status will improve  Outcome: PROGRESSING AS EXPECTED     Problem: Mobility  Goal: Risk for activity intolerance will decrease  Outcome: PROGRESSING AS EXPECTED      No

## 2020-07-17 NOTE — PROGRESS NOTES
Pt taken to CT with transport and this RN. Premedicated with ativan per MAR, fentanyl ordered but not required, pt able to tolerate scan with IV ativan. Fentanyl returned to [facility] with mary jo Vogel RN. Pt returned to room.
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
## 2020-07-17 NOTE — PROGRESS NOTES
Pt arrived to unit via WC on zoll monitor. Pt oriented to room, unit, and plan of care. Tele-monitor placed. All questions answered at this time. Call light within reach, fall precautions in place, will continue to monitor.

## 2020-07-17 NOTE — PROGRESS NOTES
EKG reviewed by RN and MD, per MD not a STEMI. Pt denies CP, VSS. CT unable to be completed last night d/t pt being unable to lay flat. Will try again with medication per MD

## 2020-07-17 NOTE — ASSESSMENT & PLAN NOTE
Patient woke up with chest pain which has become pleuritic  He has been on a long trip thus d-dimer ordered  EKG and troponins negative  Serial trops  Continuous telemetry monitoring  Aspirin  Stress test in the morning  Cardiology consulted  Consider CTA if D-dimer is +

## 2020-07-17 NOTE — PROCEDURES
Patient presents to NM suite for cardiac stress test with MPI. Nursing goals identified: knowledge deficit, potential for anxiety r/t stress test, potential for compromised cardiac output. Care plan includes educating patient, reassurance, access to Aminophylline and access to ACLS cart/team. Labs and ECG reviewed. No caffeine and NPO confirmed. Resting images attained and patient prepped for pharmacological stress study. EKG prior to starting test shows ST elevation inferior leads II, III, AVF which is new comparing last EKG done this am. Pt denies current cardiac symptoms. EKG taken to Dr Hanna, who stated it is OK to proceed with cardiac stress test.  Lexiscan given while patient ambulated on TM x 2 min. Reported &/or observed symptoms include: mild sob, mild headache.   Caffeinated beverage provided. Symptoms resolved.

## 2020-07-18 PROCEDURE — 99217 PR OBSERVATION CARE DISCHARGE: CPT | Performed by: HOSPITALIST

## 2020-07-18 NOTE — DISCHARGE INSTRUCTIONS
Discharge Instructions    Discharged to home by car with friend. Discharged via walking, hospital escort: Yes.  Special equipment needed: Not Applicable    Be sure to schedule a follow-up appointment with your primary care doctor or any specialists as instructed.     Discharge Plan:   Diet Plan: Discussed  Activity Level: Discussed  Smoking Cessation Offered: Patient Refused  Confirmed Follow up Appointment: Patient to Call and Schedule Appointment  Confirmed Symptoms Management: Discussed  Medication Reconciliation Updated: Yes    I understand that a diet low in cholesterol, fat, and sodium is recommended for good health. Unless I have been given specific instructions below for another diet, I accept this instruction as my diet prescription.   Other diet: Heart healthy    Special Instructions: None    · Is patient discharged on Warfarin / Coumadin?   No     Depression / Suicide Risk    As you are discharged from this [facility] facility, it is important to learn how to keep safe from harming yourself.    Recognize the warning signs:  · Abrupt changes in personality, positive or negative- including increase in energy   · Giving away possessions  · Change in eating patterns- significant weight changes-  positive or negative  · Change in sleeping patterns- unable to sleep or sleeping all the time   · Unwillingness or inability to communicate  · Depression  · Unusual sadness, discouragement and loneliness  · Talk of wanting to die  · Neglect of personal appearance   · Rebelliousness- reckless behavior  · Withdrawal from people/activities they love  · Confusion- inability to concentrate     If you or a loved one observes any of these behaviors or has concerns about self-harm, here's what you can do:  · Talk about it- your feelings and reasons for harming yourself  · Remove any means that you might use to hurt yourself (examples: pills, rope, extension cords, firearm)  · Get professional help from the community  (Mental Health, Substance Abuse, psychological counseling)  · Do not be alone:Call your Safe Contact- someone whom you trust who will be there for you.  · Call your local CRISIS HOTLINE 906-3967 or 802-263-0147  · Call your local Children's Mobile Crisis Response Team [place] (111) 022-8277 or www.Mixx  · Call the toll free National Suicide Prevention Hotlines   · National Suicide Prevention Lifeline 513-261-XTIF (6027)  · National Hope Line Network 800-SUICIDE (198-9377)

## 2020-07-18 NOTE — DISCHARGE SUMMARY
Discharge Summary    CHIEF COMPLAINT ON ADMISSION  Chief Complaint   Patient presents with   • Chest Pain     Started today at 0730. Used 2 epi pens yesterday after being stung by a bee.       Reason for Admission  EMS     Admission Date  7/16/2020    CODE STATUS  Prior    HPI & HOSPITAL COURSE  This is a 65 y.o. male who presented for evaluation of chest pain.  He is traveling through the [facility] from [place].  He reports on the day prior to his presentation, he was stung by a bee.  He has a anaphylactic reactions to bee stings, and therefore gave himself 2 rounds of epinephrine IM.  The following morning the day of presentation, the patient awoke with pressure sensation in his chest, difficulty taking full deep breaths.  Symptoms were worse when he would take a deep breath.  He therefore came to the emergency room for further evaluation.    Patient was initially evaluated and found to have some diffuse ST segment elevations however did not meet criteria for STEMI.  He was admitted to a monitored bed with cardiology consultation.  His initial lab work-up was relatively unremarkable with a CBC and CMP which were globally unimpressive.  He had serial troponins done, these remain negative.  Initial COVID 19 was negative.  A d-dimer was elevated 1.58, and this led to a CTA of the chest.  This study was negative for evidence of PE, however did show some emphysematous changes, and atelectasis.  This study was otherwise unremarkable.  Further imaging studies included a P. Thal which was negative for evidence of reversible ischemia, or prior MI.  EKG was interpreted as nondiagnostic.  Ejection fraction was unremarkable, wall motion was normal.    Patient clinically did well.  On the day of discharge his pain is much reduced.  He is feeling overall better, and is up and walking the unit.  He remains with some back pain which is a result of a ground-level fall and an L1 compression fracture which was treated with  kyphoplasty recently.  His back pain however is also improving.  He did not have any significant ectopy on the monitor.    At this point the patient's work-up has in my opinion effectively ruled out any real likelihood of any emergent or urgent etiology of the patient's chest pain.  Clinically he is improved, and I think he may be reasonably discharged to follow-up with his doctors in [place].  We will be sending him home with hard copies of his work-up so that he may take this to his follow-up physician.       Therefore, he is discharged in good and stable condition to home with close outpatient follow-up.    The patient recovered much more quickly than anticipated on admission.    Discharge Date  7/17/2020    FOLLOW UP ITEMS POST DISCHARGE  With PCP    DISCHARGE DIAGNOSES  Principal Problem:    Other chest pain POA: Yes  Active Problems:    Essential hypertension POA: Yes    Hypokalemia POA: Yes    Tobacco abuse POA: Yes  Resolved Problems:    * No resolved hospital problems. *      FOLLOW UP  No future appointments.  Pcp Pt States None            MEDICATIONS ON DISCHARGE     Medication List      CONTINUE taking these medications      Instructions   Aleve 220 MG tablet  Generic drug:  naproxen   Take 880 mg by mouth 3 times a day. 4 tablets = 880 mg  Dose:  880 mg     diazePAM 5 MG Tabs  Commonly known as:  VALIUM   Take 5 mg by mouth every evening as needed (back pain). Indications: Muscle Spasm  Dose:  5 mg     HYDROcodone/acetaminophen  MG Tabs  Commonly known as:  NORCO   Take 1 Tab by mouth every 6 hours as needed.  Dose:  1 Tab     lisinopril 20 MG Tabs  Commonly known as:  PRINIVIL   Take 20 mg by mouth every day.  Dose:  20 mg     Procardia XL 90 MG CR tablet  Generic drug:  NIFEdipine SR   Take 90 mg by mouth every day.  Dose:  90 mg     sildenafil 5 mg/2 mL in Ora-Sweet-Ora-Plus liquid oral suspension   Take 0.25 mg/kg by mouth every 8 hours. Shake well    Last dose 3 weeks ago per pt  Dose:   0.25 mg/kg            Allergies  Allergies   Allergen Reactions   • Bee Anaphylaxis       DIET  No orders of the defined types were placed in this encounter.      ACTIVITY  As tolerated.  Weight bearing as tolerated    CONSULTATIONS  Cardiology    PROCEDURES  None    LABORATORY  Lab Results   Component Value Date    SODIUM 140 07/16/2020    POTASSIUM 3.3 (L) 07/16/2020    CHLORIDE 102 07/16/2020    CO2 25 07/16/2020    GLUCOSE 108 (H) 07/16/2020    BUN 16 07/16/2020    CREATININE 0.69 07/16/2020        Lab Results   Component Value Date    WBC 7.1 07/16/2020    HEMOGLOBIN 14.1 07/16/2020    HEMATOCRIT 38.9 (L) 07/16/2020    PLATELETCT 131 (L) 07/16/2020        Total time of the discharge process exceeds 40 minutes.  Patient seen and examined twice on the day of discharge, the majority of that time spent with him at the bedside.  Standard discharge instructions reviewed with this patient, and he verbalized understanding of these.

## 2020-07-18 NOTE — PROGRESS NOTES
Pt DC'd. IV removed, discharge instructions provided to patient, pt verbalizes understanding. Pt states all questions have been answered. Copy of discharge paperwork provided to pt, signed copy in chart. Pt states all belongings in possession. Pt escorted off unit by RN without incident.